# Patient Record
Sex: MALE | Race: WHITE | NOT HISPANIC OR LATINO
[De-identification: names, ages, dates, MRNs, and addresses within clinical notes are randomized per-mention and may not be internally consistent; named-entity substitution may affect disease eponyms.]

---

## 2021-08-18 ENCOUNTER — APPOINTMENT (OUTPATIENT)
Dept: OTOLARYNGOLOGY | Facility: CLINIC | Age: 26
End: 2021-08-18
Payer: SELF-PAY

## 2021-08-18 VITALS
HEART RATE: 81 BPM | WEIGHT: 250 LBS | DIASTOLIC BLOOD PRESSURE: 89 MMHG | BODY MASS INDEX: 32.08 KG/M2 | HEIGHT: 74 IN | SYSTOLIC BLOOD PRESSURE: 125 MMHG

## 2021-08-18 DIAGNOSIS — B00.9 HERPESVIRAL INFECTION, UNSPECIFIED: ICD-10-CM

## 2021-08-18 DIAGNOSIS — J02.9 ACUTE PHARYNGITIS, UNSPECIFIED: ICD-10-CM

## 2021-08-18 DIAGNOSIS — Z78.9 OTHER SPECIFIED HEALTH STATUS: ICD-10-CM

## 2021-08-18 DIAGNOSIS — J31.0 CHRONIC RHINITIS: ICD-10-CM

## 2021-08-18 DIAGNOSIS — J34.2 DEVIATED NASAL SEPTUM: ICD-10-CM

## 2021-08-18 DIAGNOSIS — J35.8 OTHER CHRONIC DISEASES OF TONSILS AND ADENOIDS: ICD-10-CM

## 2021-08-18 DIAGNOSIS — H60.60 UNSPECIFIED CHRONIC OTITIS EXTERNA, UNSPECIFIED EAR: ICD-10-CM

## 2021-08-18 PROBLEM — Z00.00 ENCOUNTER FOR PREVENTIVE HEALTH EXAMINATION: Status: ACTIVE | Noted: 2021-08-18

## 2021-08-18 PROCEDURE — 99204 OFFICE O/P NEW MOD 45 MIN: CPT | Mod: NC

## 2021-08-18 RX ORDER — SODIUM FLUORIDE 1.5 MG/G
PASTE, DENTIFRICE DENTAL
Refills: 0 | Status: ACTIVE | COMMUNITY

## 2021-08-18 RX ORDER — ACYCLOVIR 200 MG/5ML
200 SUSPENSION ORAL 3 TIMES DAILY
Qty: 300 | Refills: 0 | Status: ACTIVE | COMMUNITY
Start: 2021-08-18 | End: 1900-01-01

## 2021-08-18 RX ORDER — ACYCLOVIR 400 MG/1
400 TABLET ORAL 3 TIMES DAILY
Qty: 3 | Refills: 0 | Status: ACTIVE | COMMUNITY
Start: 2021-08-18 | End: 1900-01-01

## 2021-08-18 RX ORDER — IBUPROFEN 200 MG/1
200 CAPSULE, LIQUID FILLED ORAL
Refills: 0 | Status: ACTIVE | COMMUNITY
Start: 2021-08-18

## 2021-08-18 RX ORDER — CAMPHOR/EUCALYPT/MENTH/BENZOIN
LIQUID (ML) MISCELLANEOUS
Refills: 0 | Status: ACTIVE | COMMUNITY

## 2021-08-18 NOTE — PHYSICAL EXAM
[Hearing Abad Test (Tuning Fork On Forehead)] : no lateralization of tone [Normal] : no masses and lesions seen, face is symmetric [FreeTextEntry6] : minimal cerumen removed via curettage  [FreeTextEntry7] : minimal cerumen removed via curettage  [FreeTextEntry1] : Impacted wisdom tooth on the lower right. Leukoplakia right cheek. Erythema of posterior tonsillar pillar on the left with Ulceration.  Posterior tonsillar pillar onto the pharynx inflamed. [FreeTextEntry2] : Sinuses nontender to percussion. sensations intact.

## 2021-08-18 NOTE — REVIEW OF SYSTEMS
[Post Nasal Drip] : post nasal drip [Throat Itching] : throat itching [Depression] : depression [Negative] : Heme/Lymph

## 2021-08-18 NOTE — REASON FOR VISIT
[Initial Evaluation] : an initial evaluation for [FreeTextEntry2] : Patient here to check on sinus infection

## 2021-08-18 NOTE — ADDENDUM
[FreeTextEntry1] : Documented by Benson Gregorio acting as a scribe for Dr. Brijesh Gonzalez on (08/18/2021).\par \par All medical record entries made by the Scribe were at my, Dr. Brijesh Gonzalez's, direction and personally dictated by me on (08/18/2021). I have reviewed the chart and agree that the record accurately reflects my personal performance of the history, physical exam, assessment and plan. I have also personally directed, reviewed, and agree with the discharge instructions.\par \par

## 2021-08-18 NOTE — HISTORY OF PRESENT ILLNESS
[de-identified] : The patient presents with c/o sore throat for the past week starting 8/11/21. He reports needing to turning off his Air conditioning which exacerbates his sore throat. Pt adds left sided throat pain when swallowing in the left tonsil BOT region.

## 2021-08-18 NOTE — ASSESSMENT
[FreeTextEntry1] : Reviewed and reconciled medications, allergies, PMHx, PSHx, SocHx, FMHx.\par \par Left tonsil-BOT throat pain (3 weeks)\par Impacted wisdom tooth on the lower right. \par Leukoplakia right cheek. \par Erythema of posterior tonsillar pillar on the left with Ulceration. \par Posterior tonsillar pillar onto the pharynx inflamed.\par \par Plan:\par Cerumen removed. Dioxirinse. Zovirax BID. Acyclovir- pill for 1 day, gargle liquid for 10 days. FU prn, or if sx do not resolve in 5-7 days.

## 2024-05-27 ENCOUNTER — NON-APPOINTMENT (OUTPATIENT)
Age: 29
End: 2024-05-27